# Patient Record
Sex: MALE | Race: WHITE | Employment: OTHER | URBAN - METROPOLITAN AREA
[De-identification: names, ages, dates, MRNs, and addresses within clinical notes are randomized per-mention and may not be internally consistent; named-entity substitution may affect disease eponyms.]

---

## 2017-05-02 PROCEDURE — 84403 ASSAY OF TOTAL TESTOSTERONE: CPT | Performed by: UROLOGY

## 2017-06-26 PROBLEM — R80.9 MICROALBUMINURIA DUE TO TYPE 2 DIABETES MELLITUS (HCC): Status: ACTIVE | Noted: 2017-06-26

## 2017-06-26 PROBLEM — E11.29 MICROALBUMINURIA DUE TO TYPE 2 DIABETES MELLITUS (HCC): Status: ACTIVE | Noted: 2017-06-26

## 2017-06-28 PROCEDURE — 36415 COLL VENOUS BLD VENIPUNCTURE: CPT | Performed by: UROLOGY

## 2017-06-28 PROCEDURE — 84403 ASSAY OF TOTAL TESTOSTERONE: CPT | Performed by: UROLOGY

## 2017-07-28 PROCEDURE — 86803 HEPATITIS C AB TEST: CPT | Performed by: FAMILY MEDICINE

## 2017-09-05 PROCEDURE — 84403 ASSAY OF TOTAL TESTOSTERONE: CPT | Performed by: UROLOGY

## 2017-10-13 PROCEDURE — 86334 IMMUNOFIX E-PHORESIS SERUM: CPT | Performed by: FAMILY MEDICINE

## 2017-10-13 PROCEDURE — 84165 PROTEIN E-PHORESIS SERUM: CPT | Performed by: FAMILY MEDICINE

## 2017-10-13 PROCEDURE — 82085 ASSAY OF ALDOLASE: CPT | Performed by: FAMILY MEDICINE

## 2017-10-13 PROCEDURE — 83883 ASSAY NEPHELOMETRY NOT SPEC: CPT | Performed by: FAMILY MEDICINE

## 2017-11-16 PROCEDURE — 84165 PROTEIN E-PHORESIS SERUM: CPT | Performed by: FAMILY MEDICINE

## 2017-11-16 PROCEDURE — 86334 IMMUNOFIX E-PHORESIS SERUM: CPT | Performed by: FAMILY MEDICINE

## 2017-11-16 PROCEDURE — 83883 ASSAY NEPHELOMETRY NOT SPEC: CPT | Performed by: FAMILY MEDICINE

## 2017-11-16 PROCEDURE — 84154 ASSAY OF PSA FREE: CPT | Performed by: FAMILY MEDICINE

## 2017-11-16 PROCEDURE — 84153 ASSAY OF PSA TOTAL: CPT | Performed by: FAMILY MEDICINE

## 2017-11-18 PROBLEM — M89.9 LYTIC BONE LESION OF LEFT FEMUR: Status: ACTIVE | Noted: 2017-11-18

## 2017-11-18 PROBLEM — K86.9 PANCREATIC LESION: Status: ACTIVE | Noted: 2017-11-18

## 2017-12-18 PROCEDURE — 84403 ASSAY OF TOTAL TESTOSTERONE: CPT | Performed by: UROLOGY

## 2017-12-20 PROBLEM — B37.42 CANDIDIASIS OF PENIS: Status: ACTIVE | Noted: 2017-12-20

## 2018-01-10 PROCEDURE — 82570 ASSAY OF URINE CREATININE: CPT | Performed by: FAMILY MEDICINE

## 2018-01-10 PROCEDURE — 82043 UR ALBUMIN QUANTITATIVE: CPT | Performed by: FAMILY MEDICINE

## 2018-01-10 PROCEDURE — 81003 URINALYSIS AUTO W/O SCOPE: CPT | Performed by: FAMILY MEDICINE

## 2018-01-12 PROBLEM — E11.65 UNCONTROLLED TYPE 2 DIABETES MELLITUS WITH HYPERGLYCEMIA, WITHOUT LONG-TERM CURRENT USE OF INSULIN (HCC): Status: ACTIVE | Noted: 2018-01-12

## 2018-01-12 PROBLEM — N18.30 UNCONTROLLED TYPE 2 DIABETES MELLITUS WITH STAGE 3 CHRONIC KIDNEY DISEASE, WITHOUT LONG-TERM CURRENT USE OF INSULIN (HCC): Status: ACTIVE | Noted: 2018-01-12

## 2018-01-12 PROBLEM — IMO0002 UNCONTROLLED TYPE 2 DIABETES MELLITUS WITH STAGE 3 CHRONIC KIDNEY DISEASE, WITHOUT LONG-TERM CURRENT USE OF INSULIN: Status: ACTIVE | Noted: 2018-01-12

## 2018-01-12 PROBLEM — E11.65 UNCONTROLLED TYPE 2 DIABETES MELLITUS WITH STAGE 3 CHRONIC KIDNEY DISEASE, WITHOUT LONG-TERM CURRENT USE OF INSULIN (HCC): Status: ACTIVE | Noted: 2018-01-12

## 2018-01-12 PROBLEM — E11.22 UNCONTROLLED TYPE 2 DIABETES MELLITUS WITH STAGE 3 CHRONIC KIDNEY DISEASE, WITHOUT LONG-TERM CURRENT USE OF INSULIN (HCC): Status: ACTIVE | Noted: 2018-01-12

## 2018-01-29 PROBLEM — N40.1 BPH WITH URINARY OBSTRUCTION: Status: ACTIVE | Noted: 2018-01-29

## 2018-01-29 PROBLEM — N13.8 BPH WITH URINARY OBSTRUCTION: Status: ACTIVE | Noted: 2018-01-29

## 2018-03-26 PROCEDURE — 84403 ASSAY OF TOTAL TESTOSTERONE: CPT | Performed by: UROLOGY

## 2019-10-21 PROBLEM — E78.1 HYPERTRIGLYCERIDEMIA: Status: ACTIVE | Noted: 2019-10-21

## 2020-10-28 ENCOUNTER — OFFICE VISIT (OUTPATIENT)
Dept: FAMILY MEDICINE CLINIC | Facility: CLINIC | Age: 70
End: 2020-10-28
Payer: MEDICARE

## 2020-10-28 VITALS
HEART RATE: 83 BPM | DIASTOLIC BLOOD PRESSURE: 76 MMHG | RESPIRATION RATE: 18 BRPM | SYSTOLIC BLOOD PRESSURE: 121 MMHG | TEMPERATURE: 98 F | OXYGEN SATURATION: 98 %

## 2020-10-28 DIAGNOSIS — Z20.822 EXPOSURE TO COVID-19 VIRUS: Primary | ICD-10-CM

## 2020-10-28 PROCEDURE — 99202 OFFICE O/P NEW SF 15 MIN: CPT | Performed by: NURSE PRACTITIONER

## 2020-10-28 NOTE — PATIENT INSTRUCTIONS
1. Rest. Drink plenty of fluids. 2. Supportive care as discussed. 3. Self quarantine at this time per CDC guidelines. (Until 14 days from date of exposure). 4. Covid-19 testing sent to lab. 5. Follow up with PMD as needed.  Go to the emergency departm or check [employer] for results. 10 Ways to Manage Your Health at Home      1. Stay home from work, school, and away from other public places. If you must go out, avoid using any kind of public transportation, ridesharing, or taxis.    2. Monitor your sympt provider with any questions.     Home Isolation  If you have tested positive for COVID-19, you should remain under home isolation precautions following the below guidelines:  • At least 24 hours have passed since recovery defined as resolution of fever with interested in donating your plasma to help treat others diagnosed with the virus, please contact Marisa directly on their website: [email or web address]    Who is eligible to donate convalescent plasma?     Potential co

## 2020-10-28 NOTE — PROGRESS NOTES
CHIEF COMPLAINT:   Patient presents with:  Covid      HPI:   Alva Mendez is a 65year old male who presents for covid-19 testing. Patient reports he is not having any symptoms or complaints. Notes exposure on 10/21/20.  Denies any sore throat, cough, • OBESITY    • OTHER DISEASES 12/03    negative G[facility] @ [facility]   • Serrated adenoma of colon 11/22/2004   • SLEEP APNEA 7/21/16-Split    AHI 79 RDI 80 REM AHI 54 Supine AHI 79 non-supine AHI 0 Sao2 [place] 73% /CPAP-12CWP   • Vitamin D deficiency 3/15/2014 LUNGS: clear to auscultation bilaterally, no rales, wheezes or rhonchi. Breathing is non labored.   CARDIO: RRR without murmur  GI: active BS's x4,no masses, hepatosplenomegaly, or tenderness on direct palpation  EXTREMITIES: no cyanosis, clubbing or edema There is no specific antiviral treatment recommended for COVID-19. People with COVID-19 should receive supportive care to help relieve symptoms.       Anyone who has been in close contact with someone who has COVID-19 should quarantine for at least 14 days 8. As much as possible, stay in a specific room and away from other people in your home. Also, you should use a separate bathroom, if available. If you need to be around other people in or outside of the home, wear a facemask.    9. Avoid sharing personal i If you have a fever with cough or shortness of breath but have not been exposed to someone with COVID-19 and have not tested positive for COVID-19, you should also stay home and away from others for a total of 10 days after your symptoms started, and at [employer] You can also get more information at the following websites:   Centers for Disease Control & Prevention (CDC)  What to do if you are sick with coronavirus disease 2019, [email or web address]. pdf

## 2021-09-12 PROBLEM — E78.1 HYPERTRIGLYCERIDEMIA: Status: ACTIVE | Noted: 2021-09-12

## 2021-12-01 ENCOUNTER — OFFICE VISIT (OUTPATIENT)
Dept: FAMILY MEDICINE CLINIC | Facility: CLINIC | Age: 71
End: 2021-12-01
Payer: MEDICARE

## 2021-12-01 VITALS
HEIGHT: 70 IN | HEART RATE: 83 BPM | WEIGHT: 208 LBS | SYSTOLIC BLOOD PRESSURE: 134 MMHG | DIASTOLIC BLOOD PRESSURE: 70 MMHG | OXYGEN SATURATION: 99 % | TEMPERATURE: 98 F | RESPIRATION RATE: 18 BRPM | BODY MASS INDEX: 29.78 KG/M2

## 2021-12-01 DIAGNOSIS — Z20.822 ENCOUNTER FOR LABORATORY TESTING FOR COVID-19 VIRUS: Primary | ICD-10-CM

## 2021-12-01 PROCEDURE — 99213 OFFICE O/P EST LOW 20 MIN: CPT | Performed by: NURSE PRACTITIONER

## 2021-12-01 NOTE — PATIENT INSTRUCTIONS
1. Rest. Drink plenty of fluids. 2. Supportive care as discussed. 3. Covid-19 testing sent to lab. (If positive self quarantine should extend until at least 10 days from date of test since you are asymptomatic, AND no fever for at least 24hrs, AND and i can lessen stress on the public health system, especially when new infections are rapidly rising. Your local public health authorities make the final decisions about how long quarantine should last, based on local conditions and needs.  Follow the recommen specific room and away from other people in your home. Also, you should use a separate bathroom, if available. If you need to be around other people in or outside of the home, wear a facemask.    9. Avoid sharing personal items with other people in your todd for a total of 10 days after your symptoms started, and at least 24 hours after your fever is gone and symptoms are getting better, whichever is longer. Patients with pending COVID-19 test results should follow all care and home isolation instructions.   Rica Yoder be eligible to donate. If you’re instructed by Alek Arnold that a repeat test is required, please contact the [facility]-19 Nurse Triage Line at [phone].     Additional Information      You can also get more information at the following websites Post-COVID condition? The best way to prevent the long-term symptoms of COVID-19 is by preventing COVID-19.     Important ways to slow the spread of COVID 19 are:   • Get the COVID 19 vaccine and encourage others to get the COVID 19 vaccine   • Wear a mask

## 2021-12-01 NOTE — PROGRESS NOTES
CHIEF COMPLAINT:   Patient presents with:  Covid-19 Test: no symptoms. Was gathering on Thanksgiving. HPI:   Zoya Richards is a 66year old male who presents for covid-19 testing. Patient reports he is not having any symptoms or complaints.  Beckie Bonner MG/ML Intramuscular Solution Inject 220 mg into the muscle every 14 (fourteen) days.           Past Medical History:   Diagnosis Date   • DIABETES age early 46s    Type 2 DM   • Diabetes ([facility] [address]    • Essential hypertension    • GERD    • Hypercalciuria    • H canals or external ears. NOSE: Nostrils patent, no nasal discharge, nasal mucosa wnl   THROAT: Oral mucosa pink, moist. Posterior pharynx is not erythematous. No exudates. No tonsillar hypertrophy noted. No trismus. Uvula midline with no swelling.  [employer] employees, and communities. As concerns arise about the new strain of coronavirus that causes COVID-19, Meena Phelps is here to provide community members reliable answers to any questions they may have.     Please review the entirety of this infor quarantine, you should  • Watch for symptoms until 14 days after exposure. • If you have symptoms, immediately self-isolate and contact your local public health authority or healthcare provider.   • Wear a mask, stay at least 6 feet from others, wash your such as: extreme weakness, difficult breathing, or unrelenting fevers greater than 100.4 degrees Fahrenheit, you should contact your health care provider before seeking further care.   Process measures to keep everyone safe in this difficult time are [facility] care provider within 2 days of your discharge to arrange for a telehealth follow-up.  CDC does not recommend repeat testing after a positive test.  Convalescent Plasma Donation Program  Meena Phelps, in conjunction with Bailey Faye., is looking f [email or web address]. pdf  [email or web address]  http://www.[facility].gov/topics-services/diseases-and-conditions/dise https://health.[email or web address]/coronavirus/covid-19-information/covid-19-long-haulers. html  Long-term effects of covid-19. (n.d.).  Retrieved May 11, 2021, from [employer].  What it means to be A Coronavirus

## 2022-01-05 ENCOUNTER — HOSPITAL ENCOUNTER (OUTPATIENT)
Age: 72
Discharge: HOME | End: 2022-01-05
Payer: MEDICARE

## 2022-01-05 VITALS
SYSTOLIC BLOOD PRESSURE: 134 MMHG | TEMPERATURE: 97 F | DIASTOLIC BLOOD PRESSURE: 72 MMHG | HEART RATE: 91 BPM | OXYGEN SATURATION: 96 % | RESPIRATION RATE: 18 BRPM

## 2022-01-05 DIAGNOSIS — Z20.822 EXPOSURE TO COVID-19 VIRUS: Primary | ICD-10-CM

## 2022-01-07 LAB — SARS-COV-2 RNA RESP QL NAA+PROBE: NOT DETECTED

## 2022-03-02 PROBLEM — E11.9 TYPE 2 DIABETES MELLITUS WITHOUT COMPLICATION, WITHOUT LONG-TERM CURRENT USE OF INSULIN (HCC): Status: ACTIVE | Noted: 2022-03-02

## 2022-03-16 PROBLEM — R42 DIZZINESS: Status: ACTIVE | Noted: 2022-03-16

## 2022-12-28 ENCOUNTER — OFFICE VISIT (OUTPATIENT)
Dept: FAMILY MEDICINE CLINIC | Facility: CLINIC | Age: 72
End: 2022-12-28
Payer: MEDICARE

## 2022-12-28 VITALS
OXYGEN SATURATION: 98 % | BODY MASS INDEX: 30.24 KG/M2 | HEIGHT: 71 IN | TEMPERATURE: 98 F | WEIGHT: 216 LBS | SYSTOLIC BLOOD PRESSURE: 142 MMHG | RESPIRATION RATE: 18 BRPM | HEART RATE: 79 BPM | DIASTOLIC BLOOD PRESSURE: 73 MMHG

## 2022-12-28 DIAGNOSIS — J02.9 SORE THROAT: Primary | ICD-10-CM

## 2022-12-28 LAB
CONTROL LINE PRESENT WITH A CLEAR BACKGROUND (YES/NO): YES YES/NO
STREP GRP A CUL-SCR: NEGATIVE

## 2022-12-28 PROCEDURE — 87637 SARSCOV2&INF A&B&RSV AMP PRB: CPT | Performed by: NURSE PRACTITIONER

## 2022-12-28 PROCEDURE — 87880 STREP A ASSAY W/OPTIC: CPT | Performed by: NURSE PRACTITIONER

## 2022-12-28 PROCEDURE — 99213 OFFICE O/P EST LOW 20 MIN: CPT | Performed by: NURSE PRACTITIONER

## 2022-12-29 LAB
FLUAV + FLUBV RNA SPEC NAA+PROBE: NOT DETECTED
FLUAV + FLUBV RNA SPEC NAA+PROBE: NOT DETECTED
RSV RNA SPEC NAA+PROBE: NOT DETECTED
SARS-COV-2 RNA RESP QL NAA+PROBE: DETECTED